# Patient Record
Sex: FEMALE | Race: WHITE | Employment: UNEMPLOYED | URBAN - METROPOLITAN AREA
[De-identification: names, ages, dates, MRNs, and addresses within clinical notes are randomized per-mention and may not be internally consistent; named-entity substitution may affect disease eponyms.]

---
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
## 2025-03-14 ENCOUNTER — OFFICE VISIT (OUTPATIENT)
Dept: URGENT CARE | Age: 25
End: 2025-03-14
Payer: COMMERCIAL

## 2025-03-14 VITALS
SYSTOLIC BLOOD PRESSURE: 112 MMHG | WEIGHT: 293 LBS | DIASTOLIC BLOOD PRESSURE: 76 MMHG | OXYGEN SATURATION: 99 % | RESPIRATION RATE: 18 BRPM | HEART RATE: 77 BPM | TEMPERATURE: 97.3 F

## 2025-03-14 DIAGNOSIS — J01.90 ACUTE SINUSITIS, RECURRENCE NOT SPECIFIED, UNSPECIFIED LOCATION: ICD-10-CM

## 2025-03-14 DIAGNOSIS — R06.02 SHORTNESS OF BREATH: Primary | ICD-10-CM

## 2025-03-14 PROCEDURE — G0382 LEV 3 HOSP TYPE B ED VISIT: HCPCS

## 2025-03-14 RX ORDER — FLUTICASONE PROPIONATE 50 MCG
1 SPRAY, SUSPENSION (ML) NASAL DAILY
Qty: 11.1 ML | Refills: 0 | Status: SHIPPED | OUTPATIENT
Start: 2025-03-14

## 2025-03-14 RX ORDER — BUSPIRONE HYDROCHLORIDE 7.5 MG/1
TABLET ORAL
COMMUNITY
Start: 2025-01-02

## 2025-03-14 RX ORDER — ALBUTEROL SULFATE 90 UG/1
2 INHALANT RESPIRATORY (INHALATION) EVERY 6 HOURS PRN
Qty: 8.5 G | Refills: 0 | Status: SHIPPED | OUTPATIENT
Start: 2025-03-14

## 2025-03-14 RX ORDER — GABAPENTIN 300 MG/1
600 CAPSULE ORAL 3 TIMES DAILY
COMMUNITY
Start: 2025-03-10 | End: 2026-03-10

## 2025-03-14 NOTE — PROGRESS NOTES
[facility] Now  Name: Cesia Estrada      : 2000      MRN: [id number]  Encounter Provider: NANCY Orlando  Encounter Date: 3/14/2025   Encounter department: [facility] [place]  :  Impressions and recommendations discussed with patient.   Will start albuterol inhaler as directed-lung sounds clear on exam and vital signs normal, in no acute distress in the office, will hold off on chest x-ray at this time.   Please begin Flonase and Ocean saline nasal sprays as directed.   Follow up with PCP if no relief within one week.   Go to ED for worsening wheezing, shortness of breath.  Assessment & Plan  Shortness of breath    Orders:    albuterol (ProAir HFA) 90 mcg/act inhaler; Inhale 2 puffs every 6 (six) hours as needed for wheezing    Acute sinusitis, recurrence not specified, unspecified location    Orders:    fluticasone (FLONASE) 50 mcg/act nasal spray; 1 spray into each nostril daily    sodium chloride (OCEAN) 0.65 % nasal spray; 1 spray into each nostril as needed for congestion or rhinitis        Patient Instructions  Patient Education     Sinusitis, Adult ED   General Information   You came to the Emergency Department (ED) for sinusitis. Your sinuses are hollow areas in the bones of your face. They have a thin lining that normally makes a small amount of mucus. When you have sinusitis, the lining gets swollen and makes extra mucus. You may have sinusitis with or after a cold. Most of the time sinusitis will get better in 1 to 2 weeks.  Sinusitis is most often caused by a virus, so antibiotics won’t help. But some people do need antibiotics. If the doctor ordered antibiotics for you, be sure to follow the instructions. It is important to take all of your antibiotics even if you start to feel better.  What care is needed at home?   Call your regular doctor to let them know you were in the ED. Make a follow-up appointment if you were told to.  Try to thin the mucus.  Drink lots of  liquids to stay hydrated.  Use a cool mist humidifier to avoid dry air.  Use saline nose drops or a saline nose rinse to relieve stuffiness.  Wash your hands often. This will help keep others healthy.  Do not smoke or be in smoke-filled places. Avoid things that may cause breathing problems like fumes, pollution, dust, and other common allergens and irritants.  You may want to take medicine like ibuprofen, naproxen, or acetaminophen to help with pain.  When do I need to get emergency help?   Return to the ED if:   You have a stiff neck, especially if you also have fever, chills, vomiting, or severe headache  You have trouble thinking clearly.  You have trouble seeing or have double vision.  You have swelling or redness or pain around one or both eyes.  You have a fever of 102°F (38.9°C) or higher, or have shaking chills or sweats.  When do I need to call the doctor?   You have an upset stomach and throwing up.  You have more pain in your face and head.  You are not getting better within 1 to 2 weeks.  You have new or worsening symptoms.  Last Reviewed Date   2020-08-03  Consumer Information Use and Disclaimer   This generalized information is a limited summary of diagnosis, treatment, and/or medication information. It is not meant to be comprehensive and should be used as a tool to help the user understand and/or assess potential diagnostic and treatment options. It does NOT include all information about conditions, treatments, medications, side effects, or risks that may apply to a specific patient. It is not intended to be medical advice or a substitute for the medical advice, diagnosis, or treatment of a health care provider based on the health care provider's examination and assessment of a patient’s specific and unique circumstances. Patients must speak with a health care provider for complete information about their health, medical questions, and treatment options, including any risks or benefits regarding use  of medications. This information does not endorse any treatments or medications as safe, effective, or approved for treating a specific patient. [employer] and its affiliates disclaim any warranty or liability relating to this information or the use thereof. The use of this information is governed by the Terms of Use, available at https://www.[employer]/en/know/clinical-effectiveness-terms   Copyright   Follow up with PCP in 3-5 days.  Proceed to  ER if symptoms worsen.    If tests are performed, our office will contact you with results only if changes need to made to the care plan discussed with you at the visit. You can review your full results on [facility] MyChart.    Chief Complaint:   Chief Complaint   Patient presents with    Shortness of Breath    Wheezing    Cough     Wheezing, shortness of breath, cough and nasal congestion it started last night.     History of Present Illness   Shortness of Breath  The current episode started yesterday. The problem occurs daily. The problem is unchanged. Associated symptoms include coughing, rhinorrhea and wheezing. Pertinent negatives include no chest pain, chest pressure, dizziness, fatigue, hoarseness of voice, leg swelling, orthopnea, palpitations, sore throat, stridor or sweats. The symptoms are aggravated by activity. There was no intake of a foreign body. Past treatments include nothing. Her past medical history is significant for asthma. There is no history of allergies, anxiety/panic attacks, bronchiolitis, congenital heart disease, DVT, GERD, PE, spontaneous pneumothorax or tobacco use. She has been Behaving normally. Urine output has been normal.   Wheezing  The current episode started yesterday. The problem is unchanged. Associated symptoms include coughing, rhinorrhea and wheezing. Pertinent negatives include no chest pain, chest pressure, dizziness, fatigue, hoarseness of voice, leg swelling, orthopnea, palpitations, sore throat, stridor or  sweats. Her past medical history is significant for asthma. There is no history of allergies, anxiety/panic attacks, bronchiolitis, congenital heart disease, DVT, GERD, PE, spontaneous pneumothorax or tobacco use.   Cough  Associated symptoms include rhinorrhea, shortness of breath and wheezing. Pertinent negatives include no chest pain, ear pain, eye redness, fever, headaches, myalgias, postnasal drip, rash, sore throat or sweats. Her past medical history is significant for asthma. Patient reports remote history of asthma, is not currently being treated.         Review of Systems   Constitutional:  Negative for fatigue and fever.   HENT:  Positive for congestion and rhinorrhea. Negative for ear discharge, ear pain, hoarse voice, postnasal drip, sinus pressure, sinus pain, sneezing and sore throat.    Eyes: Negative.  Negative for pain, discharge, redness and itching.   Respiratory:  Positive for cough, shortness of breath and wheezing. Negative for apnea, choking, chest tightness and stridor.    Cardiovascular: Negative.  Negative for chest pain, palpitations, orthopnea and leg swelling.   Gastrointestinal: Negative.  Negative for diarrhea, nausea and vomiting.   Endocrine: Negative.  Negative for polydipsia, polyphagia and polyuria.   Genitourinary: Negative.  Negative for decreased urine volume and flank pain.   Musculoskeletal: Negative.  Negative for arthralgias, back pain, myalgias, neck pain and neck stiffness.   Skin: Negative.  Negative for color change and rash.   Allergic/Immunologic: Negative.    Neurological: Negative.  Negative for dizziness, facial asymmetry, light-headedness, numbness and headaches.   Hematological: Negative.  Negative for adenopathy.   Psychiatric/Behavioral: Negative.       Past Medical History   History reviewed. No pertinent past medical history.  Past Surgical History:   Procedure Laterality Date    WISDOM TOOTH EXTRACTION       No family history on file.  she reports that she  has never smoked. She has never used smokeless tobacco. She reports that she does not drink alcohol and does not use drugs.  Current Outpatient Medications   Medication Instructions    albuterol (ProAir HFA) 90 mcg/act inhaler 2 puffs, Inhalation, Every 6 hours PRN    busPIRone (BUSPAR) 7.5 mg tablet 1 BY MOUTH EVERY MONRING, AND 1 BY MOUTH IN AFTERNOON AFTER 3PM IF NEEDED    fluticasone (FLONASE) 50 mcg/act nasal spray 1 spray, Nasal, Daily    gabapentin (NEURONTIN) 600 mg, 3 times daily    Levonorgestrel (MIRENA) 20 MCG/DAY IUD 1 each    sodium chloride (OCEAN) 0.65 % nasal spray 1 spray, Nasal, As needed     Allergies   Allergen Reactions    Covid-19 (Mrna) Vaccine Anaphylaxis    Tomato - Food Allergy Throat Swelling     Doran tomatoes    Bupropion Other (See Comments)     Made her angry        Objective   /76 (BP Location: Left arm)   Pulse 77   Temp (!) 97.3 °F (36.3 °C)   Resp 18   Wt (!) 138 kg (304 lb 9.6 oz)   LMP 02/24/2025 (Exact Date)   SpO2 99%      Physical Exam  Vitals and nursing note reviewed.   Constitutional:       General: She is not in acute distress.     Appearance: She is well-developed. She is not ill-appearing, toxic-appearing or diaphoretic.      Interventions: She is not intubated.     Comments: Patient sitting comfortably, in no acute distress.    HENT:      Head: Normocephalic and atraumatic.      Mouth/Throat:      Pharynx: No pharyngeal swelling or oropharyngeal exudate.   Eyes:      Conjunctiva/sclera: Conjunctivae normal.   Cardiovascular:      Rate and Rhythm: Normal rate and regular rhythm.      Heart sounds: Normal heart sounds, S1 normal and S2 normal. Heart sounds not distant. No murmur heard.  Pulmonary:      Effort: Pulmonary effort is normal. No tachypnea, bradypnea, accessory muscle usage, prolonged expiration, respiratory distress or retractions. She is not intubated.      Breath sounds: Normal breath sounds. No stridor, decreased air movement or transmitted  "upper airway sounds. No decreased breath sounds, wheezing, rhonchi or rales.   Abdominal:      Palpations: Abdomen is soft.      Tenderness: There is no abdominal tenderness.   Musculoskeletal:         General: No swelling.      Cervical back: Neck supple.   Skin:     General: Skin is warm and dry.      Capillary Refill: Capillary refill takes less than 2 seconds.   Neurological:      Mental Status: She is alert.   Psychiatric:         Mood and Affect: Mood normal.         Portions of the record may have been created with voice recognition software.  Occasional wrong word or \"sound a like\" substitutions may have occurred due to the inherent limitations of voice recognition software.  Read the chart carefully and recognize, using context, where substitutions have occurred.  "

## 2025-03-14 NOTE — PATIENT INSTRUCTIONS
Impressions and recommendations discussed with patient.   Will start albuterol inhaler as directed-lung sounds clear on exam and vital signs normal, in no acute distress in the office, will hold off on chest x-ray at this time.   Please begin Flonase and Ocean saline nasal sprays as directed.   Follow up with PCP if no relief within one week.   Go to ED for worsening wheezing, shortness of breath.

## 2025-04-05 DIAGNOSIS — J01.90 ACUTE SINUSITIS, RECURRENCE NOT SPECIFIED, UNSPECIFIED LOCATION: ICD-10-CM

## 2025-04-05 DIAGNOSIS — R06.02 SHORTNESS OF BREATH: ICD-10-CM

## 2025-06-04 RX ORDER — FLUTICASONE PROPIONATE 50 MCG
SPRAY, SUSPENSION (ML) NASAL
Qty: 48 ML | Refills: 1 | OUTPATIENT
Start: 2025-06-04

## 2025-06-04 RX ORDER — ALBUTEROL SULFATE 90 UG/1
INHALANT RESPIRATORY (INHALATION)
OUTPATIENT
Start: 2025-06-04

## 2025-06-24 ENCOUNTER — OFFICE VISIT (OUTPATIENT)
Dept: NEUROLOGY | Facility: CLINIC | Age: 25
End: 2025-06-24
Payer: COMMERCIAL

## 2025-06-24 VITALS
HEART RATE: 62 BPM | WEIGHT: 293 LBS | DIASTOLIC BLOOD PRESSURE: 110 MMHG | SYSTOLIC BLOOD PRESSURE: 150 MMHG | HEIGHT: 66 IN | BODY MASS INDEX: 47.09 KG/M2

## 2025-06-24 DIAGNOSIS — G43.109 MIGRAINE WITH AURA AND WITHOUT STATUS MIGRAINOSUS, NOT INTRACTABLE: ICD-10-CM

## 2025-06-24 DIAGNOSIS — M54.81 OCCIPITAL NEURALGIA: Primary | ICD-10-CM

## 2025-06-24 PROCEDURE — 99205 OFFICE O/P NEW HI 60 MIN: CPT | Performed by: STUDENT IN AN ORGANIZED HEALTH CARE EDUCATION/TRAINING PROGRAM

## 2025-06-24 RX ORDER — RIZATRIPTAN BENZOATE 10 MG/1
10 TABLET ORAL ONCE AS NEEDED
Qty: 9 TABLET | Refills: 3 | Status: SHIPPED | OUTPATIENT
Start: 2025-06-24

## 2025-06-24 RX ORDER — PREGABALIN 150 MG/1
CAPSULE ORAL
Qty: 187 CAPSULE | Refills: 0 | Status: SHIPPED | OUTPATIENT
Start: 2025-06-24 | End: 2025-09-29

## 2025-06-24 NOTE — PROGRESS NOTES
"Name: Cesia Estrada      : 2000      MRN: [id number]  Encounter Provider: Marjorie Betts MD  Encounter Date: 2025   Encounter department: [facility]  :  Assessment & Plan  Occipital neuralgia  Continues to have bilateral tenderness, neuropathic pain over the bilateral occipital distribution. Previously had some relief with gabapentin, but continued to complain of breakthrough pain despite treatment with 600 mg TID. Will trial lyrica 150 mg daily with uptitration as outlined below. May need to consider retrial of ONB in the future  Orders:    pregabalin (LYRICA) 150 mg capsule; Take 1 capsule (150 mg total) by mouth daily for 7 days, THEN 1 capsule (150 mg total) 2 (two) times a day.    Migraine with aura and without status migrainosus, not intractable  Preventative:  - we discussed headache hygiene and lifestyle factors that may improve headaches, including sleep hygiene, hydration goals 64-80 oz water daily, limiting caffeine intake, and not skipping meals  - suspect that pt's \"flares\" that she reports are triggered migraines from occipital neuralgia pain. She is having migraines infrequently in comparison to her daily neuralgia pain. No need for migraine-specific preventative at this time    Abortive:  - discussed not taking over-the-counter or prescription pain medications more than 3 days per week to prevent medication overuse/rebound headache  - start maxalt 10 mg PRN at onset of migraine symptoms.   Orders:    rizatriptan (Maxalt) 10 mg tablet; Take 1 tablet (10 mg total) by mouth once as needed for migraine May repeat in 2 hours if needed      CC:   Occipital neuralgia, headaches    History of Present Illness:     23 yo F who presents for evaluation of head pain.     She has been experiencing pain in the top and back of her head. She states that this area is constantly very sensitive. At times, she endorses \"flares\" of this pain which also progresses to sharp " pain in the front of her head/behind her eyes. This can be accompanied by nausea and phonophobia.     Gabapentin - previously taking, was having pain in-between doses.     Had occipital nerve block in February. [place] that her symptoms worsened after it     Headaches started at what age? 22 years old  How often do the headaches occur?   - as of 6/24/2025: 4x a week  What time of the day do the headaches start?  No particular time of day   How long do the headaches last? Several  Are you ever headache free? Yes    Aura? without aura    Where is your headache located and pain quality? occipital  What is the intensity of pain? 9/10  Associated symptoms:   [x] Nausea       [] Vomiting        [] Diarrhea  [] Stiff or sore neck   [x] Problems with concentration  [] Photophobia     [x]Phonophobia      [] Osmophobia  [] Blurred vision   [] Prefer quiet, dark room  [x] Light-headed or dizzy     [] Tinnitus   [] Hands or feet tingle or feel numb/paresthesias      [] Ptosis      [] Facial droop  [] Lacrimation  [] Nasal congestion/rhinorrhea        Things that make the headache worse? No specific movements    Headache triggers:  stress    Have you seen someone else for headaches or pain? Yes  Have you had trigger point injection performed and how often? No  Have you had Botox injection performed and how often? No   Have you had epidural injections or transforaminal injections performed? No  Are you current pregnant or planning on getting pregnant? no  Have you ever had any Brain imaging? yes MRI brain    What medications do you take or have you taken for your headaches?   ABORTIVE:    OTC medications have been ineffective       PREVENTIVE:   Gabapentin - 600 mg TID with breakthrough pain    Mood:   Denies history of anxiety or depression or other diagnosed mood disorder    The following portions of the patient's history were reviewed and updated as appropriate: allergies, current medications, past family history, past medical  history, past social history, past surgical history and problem list.    Past Medical History:   Past Medical History[1]    Problem List[2]    Medications:    Current Medications[3]     Allergies:    Allergies[4]    Family History:     Family History[5]    Social History:       Social History     Socioeconomic History    Marital status: Single     Spouse name: Not on file    Number of children: Not on file    Years of education: Not on file    Highest education level: Not on file   Occupational History    Not on file   Tobacco Use    Smoking status: Never    Smokeless tobacco: Never   Vaping Use    Vaping status: Never Used   Substance and Sexual Activity    Alcohol use: No    Drug use: No    Sexual activity: Not on file   Other Topics Concern    Not on file   Social History Narrative    Not on file     Social Drivers of Health     Financial Resource Strain: Low Risk  (4/24/2024)    Received from [facility]    Overall Financial Resource Strain (CARDIA)     Difficulty of Paying Living Expenses: Not hard at all   Food Insecurity: No Food Insecurity (4/24/2024)    Received from [facility]    Hunger Vital Sign     Within the past 12 months, you worried that your food would run out before you got the money to buy more.: Never true     Within the past 12 months, the food you bought just didn't last and you didn't have money to get more.: Never true   Transportation Needs: No Transportation Needs (4/24/2024)    Received from [facility]    PRAPARE - Transportation     Lack of Transportation (Medical): No     Lack of Transportation (Non-Medical): No   Physical Activity: Not on file   Stress: Not on file   Social Connections: Feeling Somewhat Isolated (4/24/2024)    Received from [facility]    OASIS : Social Isolation     How often do you feel lonely or isolated from those around you?: Sometimes   Intimate Partner Violence: Not At Risk (4/24/2024)     "Received from [facility]    Humiliation, Afraid, Rape, and Kick questionnaire     Within the last year, have you been afraid of your partner or ex-partner?: No     Within the last year, have you been humiliated or emotionally abused in other ways by your partner or ex-partner?: No     Within the last year, have you been kicked, hit, slapped, or otherwise physically hurt by your partner or ex-partner?: No     Within the last year, have you been raped or forced to have any kind of sexual activity by your partner or ex-partner?: No   Housing Stability: High Risk (4/24/2024)    Received from [facility]    Housing Stability Vital Sign     In the last 12 months, was there a time when you were not able to pay the mortgage or rent on time?: Yes     Number of Times Moved in the Last Year: Not on file     At any time in the past 12 months, were you homeless or living in a shelter (including now)?: No         Objective:   BP (!) 150/110 (BP Location: Right arm, Patient Position: Sitting, Cuff Size: Large)   Pulse 62   Ht 5' 6\" (1.676 m)   Wt (!) 137 kg (302 lb)   BMI 48.74 kg/m²     General: Patient is not in any acute/apparent distress, well nourished, well developed and cooperative.   HEENT: normocephalic, atraumatic, moist membranes  Neck: supple  Extremities: no edema noted   Skin: no lesions or rash  Musculosketal: no bony abnormalities    Neurologic Examination:   Mental status: alert, awake, oriented X 3 and following commands.     Speech/Language: Speech is fluent without any dysarthria, no aphasia noted, can name, repeat, read and write and comprehension intact    Cranial Nerves:   CN I: smell not tested  CN II: Visual fields full to confrontation  CN III, IV, VI: Extraocular movements intact bilaterally. Pupils equal round and reactive to light bilaterally.  CN V: Facial sensation is normal.  CN VII: Full and symmetric facial movement.  CN VIII: Hearing is normal.  CN IX, X: " Palate elevates symmetrically.   CN XI: Shoulder shrug strength is normal.  CN XII: Tongue midline without atrophy or fasciculations.    Motor:   Strength 5/5 in all 4 extremities.  Bulk/tone - normal.  Fasiculations - none    Sensory:   Sensation intact to soft touch in all 4 extremities.    Cerebellar:   Finger-to-nose intact, normal heel to shin.    Reflexes: 2+ in all 4 extremities    Gait:   Normal gait     Imaging:   MRI brain wo 3/16/23  IMPRESSION:     No mass effect, infarct, intracranial hemorrhage, or hydrocephalus.     No parenchymal signal abnormality appreciated.     Rightward nasal septal deviation        Review of Systems:     Review of Systems   Constitutional:  Negative for appetite change, fatigue and fever.   HENT: Negative.  Negative for hearing loss, tinnitus, trouble swallowing and voice change.    Eyes: Negative.  Negative for photophobia, pain and visual disturbance.   Respiratory: Negative.  Negative for shortness of breath.    Cardiovascular: Negative.  Negative for palpitations.   Gastrointestinal: Negative.  Negative for nausea and vomiting.   Endocrine: Negative.  Negative for cold intolerance.   Genitourinary: Negative.  Negative for dysuria, frequency and urgency.   Musculoskeletal:  Negative for back pain, gait problem, myalgias, neck pain and neck stiffness.   Skin: Negative.  Negative for rash.   Allergic/Immunologic: Negative.    Neurological:  Positive for headaches. Negative for dizziness, tremors, seizures, syncope, facial asymmetry, speech difficulty, weakness, light-headedness and numbness.   Hematological: Negative.  Does not bruise/bleed easily.   Psychiatric/Behavioral: Negative.  Negative for confusion, hallucinations and sleep disturbance.     I have personally reviewed the MA's review of systems and made changes as necessary.    I have spent a total time of 60 minutes in caring for this patient on the day of the visit/encounter including Risks and benefits of tx  options, Instructions for management, Patient and family education, Risk factor reductions, Impressions, Documenting in the medical record, Reviewing/placing orders in the medical record (including tests, medications, and/or procedures), and Obtaining or reviewing history  .         [1] No past medical history on file.  [2] There is no problem list on file for this patient.  [3]   Current Outpatient Medications   Medication Sig Dispense Refill    busPIRone (BUSPAR) 7.5 mg tablet       Levonorgestrel (MIRENA) 20 MCG/DAY IUD 1 each by Intrauterine route      albuterol (ProAir HFA) 90 mcg/act inhaler Inhale 2 puffs every 6 (six) hours as needed for wheezing 8.5 g 0    fluticasone (FLONASE) 50 mcg/act nasal spray 1 spray into each nostril daily 11.1 mL 0    gabapentin (NEURONTIN) 300 mg capsule Take 600 mg by mouth in the morning and 600 mg at noon and 600 mg in the evening.      sodium chloride (OCEAN) 0.65 % nasal spray 1 spray into each nostril as needed for congestion or rhinitis 30 mL 0     No current facility-administered medications for this visit.   [4]   Allergies  Allergen Reactions    Covid-19 (Mrna) Vaccine Anaphylaxis    Tomato - Food Allergy Throat Swelling     Doran tomatoes    Bupropion Other (See Comments)     Made her angry   [5] No family history on file.